# Patient Record
Sex: FEMALE | Race: WHITE | URBAN - METROPOLITAN AREA
[De-identification: names, ages, dates, MRNs, and addresses within clinical notes are randomized per-mention and may not be internally consistent; named-entity substitution may affect disease eponyms.]

---

## 2022-05-13 ENCOUNTER — APPOINTMENT (RX ONLY)
Dept: URBAN - METROPOLITAN AREA CLINIC 328 | Facility: CLINIC | Age: 32
Setting detail: DERMATOLOGY
End: 2022-05-13

## 2022-05-13 DIAGNOSIS — L65.9 NONSCARRING HAIR LOSS, UNSPECIFIED: ICD-10-CM

## 2022-05-13 DIAGNOSIS — I73.00 RAYNAUD'S SYNDROME WITHOUT GANGRENE: ICD-10-CM | Status: INADEQUATELY CONTROLLED

## 2022-05-13 DIAGNOSIS — L24 IRRITANT CONTACT DERMATITIS: ICD-10-CM

## 2022-05-13 PROBLEM — L24.9 IRRITANT CONTACT DERMATITIS, UNSPECIFIED CAUSE: Status: ACTIVE | Noted: 2022-05-13

## 2022-05-13 PROCEDURE — ? COUNSELING

## 2022-05-13 PROCEDURE — ? IN-HOUSE DISPENSING PHARMACY

## 2022-05-13 PROCEDURE — ? PRESCRIPTION

## 2022-05-13 PROCEDURE — ? PRESCRIPTION MEDICATION MANAGEMENT

## 2022-05-13 PROCEDURE — ? ADDITIONAL NOTES

## 2022-05-13 PROCEDURE — 99214 OFFICE O/P EST MOD 30 MIN: CPT

## 2022-05-13 RX ORDER — HYDROCORTISONE 25 MG/G
CREAM TOPICAL
Qty: 30 | Refills: 2 | Status: ERX | COMMUNITY
Start: 2022-05-13

## 2022-05-13 RX ORDER — NIFEDIPINE 10 MG/1
CAPSULE, LIQUID FILLED ORAL
Qty: 90 | Refills: 1 | Status: ERX | COMMUNITY
Start: 2022-05-13

## 2022-05-13 RX ADMIN — HYDROCORTISONE: 25 CREAM TOPICAL at 00:00

## 2022-05-13 RX ADMIN — NIFEDIPINE: 10 CAPSULE, LIQUID FILLED ORAL at 00:00

## 2022-05-13 ASSESSMENT — LOCATION SIMPLE DESCRIPTION DERM
LOCATION SIMPLE: RIGHT GREAT TOE
LOCATION SIMPLE: RIGHT CHEEK
LOCATION SIMPLE: RIGHT 4TH TOE
LOCATION SIMPLE: RIGHT 3RD TOE
LOCATION SIMPLE: RIGHT 2ND TOE
LOCATION SIMPLE: SCALP
LOCATION SIMPLE: LEFT 3RD TOE

## 2022-05-13 ASSESSMENT — LOCATION DETAILED DESCRIPTION DERM
LOCATION DETAILED: RIGHT DISTAL PLANTAR GREAT TOE
LOCATION DETAILED: LEFT SUPERIOR PARIETAL SCALP
LOCATION DETAILED: RIGHT DISTAL PLANTAR 4TH TOE
LOCATION DETAILED: LEFT DISTAL PLANTAR 3RD TOE
LOCATION DETAILED: RIGHT DISTAL PLANTAR 3RD TOE
LOCATION DETAILED: RIGHT MEDIAL BUCCAL CHEEK
LOCATION DETAILED: RIGHT DISTAL PLANTAR 2ND TOE

## 2022-05-13 ASSESSMENT — LOCATION ZONE DERM
LOCATION ZONE: FACE
LOCATION ZONE: TOE
LOCATION ZONE: SCALP

## 2022-05-13 NOTE — PROCEDURE: PRESCRIPTION MEDICATION MANAGEMENT
Detail Level: Zone
Render In Strict Bullet Format?: No
Continue Regimen: #6 dispensary alopecia solution

## 2022-05-13 NOTE — PROCEDURE: IN-HOUSE DISPENSING PHARMACY
Product 8 Units Dispensed: 0
Product 57 Unit Type: mg
Name Of Product 17: #17 Xerosis Gel
Product 6 Price/Unit (In Dollars): 55
Name Of Product 23: #23 Prednisone 50mg
Name Of Product 4: #4 Acne Gel
Product 12 Unit Type: bottle(s)
Product 10 Amount/Unit (Numbers Only): 1
Product 8 Application Directions: Apply to the affected area twice daily.
Product 15 Price/Unit (In Dollars): 30
Product 6 Refills: 3
Product 1 Application Directions: Apply to the face twice daily (if another rx is being prescribed it will be applied in the AM)
Product 21 Price/Unit (In Dollars): 45
Name Of Product 13: #13 Rosacea Cream
Product 23 Application Directions: Use as directed by provider.
Product 4 Application Directions: Apply to the face at bedtime or as directed by provider.
Product 11 Price/Unit (In Dollars): 20
Product 17 Application Directions: Apply to affected area nightly.
Name Of Product 9: #9 Dermatitis Cream
Product 23 Unit Type: tablets
Name Of Product 2: #2 Acne Moisturizing Cream
Product 21 Amount/Unit (Numbers Only): 60
Product 19 Application Directions: Take one tablet twice daily with food or as directed by provider.
Product 13 Application Directions: Apply to the face twice daily or as directed by provider.
Name Of Product 16: #16 Dermatitis Topical Solution
Name Of Product 22: #22 Prednisone 20mg
Product 24 Price/Unit (In Dollars): 10
Send Charges To Patient Encounter: Yes
Product 7 Application Directions: Apply to the nails daily. Please do not apply socks for about5 hours (best if applied at night) Remove once weekly then start again.
Product 20 Price/Unit (In Dollars): 15
Name Of Product 12: #12 Melasma Emulsion
Render Product Pricing In Note: No
Product 16 Application Directions: Apply to the affected area 1-2 times daily or as directed by provider.
Product 3 Application Directions: Apply to the face once daily AM or PM or as directed by provider.
Product 10 Price/Unit (In Dollars): 35
Name Of Product 8: #8 Anti Fungal Cream
Product 3 Price/Unit (In Dollars): 40
Name Of Product 1: #1 Acne Gel
Product 20 Amount/Unit (Numbers Only): 30
Product 12 Application Directions: Apply to the face three times weekly at night for two months only. Take a two month break and then continue for two months again. (On your office months, you can apply Lytera daily)
Product 10 Application Directions: Apply to the affected area twice daily for 10 days.
Name Of Product 15: #15 Anti Fungal Shampoo
Name Of Product 21: #21 Minocycline
Product 6 Application Directions: Apply to the scalp three times weekly
Name Of Product 11: #11 Antibacterial Ointment
Product 21 Application Directions: Take one tablet twice daily with food.
Product 15 Application Directions: Apply to the scalp 2-3 times weekly. Can be applied every other shampoo as well.
Product 23 Amount/Unit (Numbers Only): 5
Name Of Product 7: #7 Anti Fungal Nail Solution
Product 2 Price/Unit (In Dollars): 50
Product 21 Unit Type: capsules
Detail Level: Zone
Product 11 Application Directions: Apply twice daily to wound.
Name Of Product 24: #24 Spironolactone
Name Of Product 5: #5 Actinic Keratosis (Gel)
Name Of Product 18: #18 Bactrim
Product 9 Application Directions: Apply to the affected area three times weekly.
Name Of Product 14: #14 Rosacea Silicone Gel
Product 2 Application Directions: Apply to the face at bedtime. Start every other night then increase usage depending on tolerance.
Name Of Product 20: #20 Loratadine
Product 5 Application Directions: For Warts/MC- apply to the areas 2-3 times weekly\\nFor (Pre)Skin Cancer- apply nightly for 2-6 weeks or as directed by provider.
Product 24 Application Directions: Take one tablet twice daily or as directed by provider.
Name Of Product 10: #10 Fungal Dermatitis Cream
Name Of Product 3: #3 Acne Gel Combo
Product 14 Application Directions: Apply to the face once daily AM or PM
Product 22 Amount/Unit (Numbers Only): 18
Product 20 Application Directions: Take one tablet daily.
Name Of Product 19: #19 Doxycycline
Name Of Product 6: #6 Alopecia Topical Solution

## 2022-05-13 NOTE — HPI: SKIN LESION
What Type Of Note Output Would You Prefer (Optional)?: Standard Output
Is This A New Presentation, Or A Follow-Up?: Skin Lesions
Additional History: \\n\\n\\n3. Patient requesting rx refill for hair loss.

## 2022-05-13 NOTE — PROCEDURE: ADDITIONAL NOTES
Render Risk Assessment In Note?: no
Detail Level: Simple
Additional Notes: Patient states she has lupus. Dr. Perry recommended follow up with rheumatologist.

## 2022-06-13 ENCOUNTER — APPOINTMENT (RX ONLY)
Dept: URBAN - METROPOLITAN AREA CLINIC 328 | Facility: CLINIC | Age: 32
Setting detail: DERMATOLOGY
End: 2022-06-13

## 2022-06-13 DIAGNOSIS — L93.0 DISCOID LUPUS ERYTHEMATOSUS: ICD-10-CM

## 2022-06-13 DIAGNOSIS — I73.00 RAYNAUD'S SYNDROME WITHOUT GANGRENE: ICD-10-CM

## 2022-06-13 PROCEDURE — ? COUNSELING

## 2022-06-13 PROCEDURE — 99214 OFFICE O/P EST MOD 30 MIN: CPT

## 2022-06-13 PROCEDURE — ? PRESCRIPTION MEDICATION MANAGEMENT

## 2022-06-13 PROCEDURE — ? PRESCRIPTION

## 2022-06-13 RX ORDER — NIFEDIPINE 10 MG/1
CAPSULE, LIQUID FILLED ORAL
Qty: 90 | Refills: 0 | Status: ERX

## 2022-06-13 RX ORDER — TRIAMCINOLONE ACETONIDE 1 MG/G
CREAM TOPICAL BID
Qty: 80 | Refills: 3 | Status: ACTIVE

## 2022-06-13 RX ORDER — TRIAMCINOLONE ACETONIDE 1 MG/G
CREAM TOPICAL
Qty: 80 | Refills: 0 | Status: ERX | COMMUNITY
Start: 2022-06-13

## 2022-06-13 RX ORDER — EFINACONAZOLE 100 MG/ML
SOLUTION TOPICAL
Qty: 8 | Refills: 3 | Status: ERX | COMMUNITY
Start: 2022-06-13

## 2022-06-13 RX ADMIN — TRIAMCINOLONE ACETONIDE: 1 CREAM TOPICAL at 00:00

## 2022-06-13 RX ADMIN — EFINACONAZOLE: 100 SOLUTION TOPICAL at 00:00

## 2022-06-13 ASSESSMENT — LOCATION SIMPLE DESCRIPTION DERM
LOCATION SIMPLE: RIGHT 3RD TOE
LOCATION SIMPLE: LEFT 3RD TOE
LOCATION SIMPLE: RIGHT 4TH TOE
LOCATION SIMPLE: RIGHT GREAT TOE
LOCATION SIMPLE: RIGHT CHEEK
LOCATION SIMPLE: RIGHT 2ND TOE

## 2022-06-13 ASSESSMENT — LOCATION DETAILED DESCRIPTION DERM
LOCATION DETAILED: LEFT DISTAL PLANTAR 3RD TOE
LOCATION DETAILED: RIGHT DISTAL PLANTAR 3RD TOE
LOCATION DETAILED: RIGHT DISTAL PLANTAR 2ND TOE
LOCATION DETAILED: RIGHT MEDIAL BUCCAL CHEEK
LOCATION DETAILED: RIGHT DISTAL PLANTAR GREAT TOE
LOCATION DETAILED: RIGHT DISTAL PLANTAR 4TH TOE

## 2022-06-13 ASSESSMENT — LOCATION ZONE DERM
LOCATION ZONE: FACE
LOCATION ZONE: TOE

## 2022-07-14 RX ORDER — NIFEDIPINE 10 MG/1
CAPSULE, LIQUID FILLED ORAL
Qty: 270 | Refills: 0 | Status: ERX